# Patient Record
Sex: MALE | Race: WHITE | HISPANIC OR LATINO | URBAN - METROPOLITAN AREA
[De-identification: names, ages, dates, MRNs, and addresses within clinical notes are randomized per-mention and may not be internally consistent; named-entity substitution may affect disease eponyms.]

---

## 2017-01-10 ENCOUNTER — FOLLOW UP ESTABLISHED (OUTPATIENT)
Dept: URBAN - METROPOLITAN AREA CLINIC 51 | Facility: CLINIC | Age: 57
End: 2017-01-10
Payer: COMMERCIAL

## 2017-01-10 DIAGNOSIS — H34.8312 BRANCH RETINAL VEIN OCCLUSION, STABLE, RIGHT EYE: ICD-10-CM

## 2017-01-10 DIAGNOSIS — H25.13 AGE-RELATED NUCLEAR CATARACT, BILATERAL: Primary | ICD-10-CM

## 2017-01-10 DIAGNOSIS — H17.821 PERIPHERAL OPACITY OF CORNEA OF RIGHT EYE: ICD-10-CM

## 2017-01-10 PROCEDURE — 92014 COMPRE OPH EXAM EST PT 1/>: CPT | Performed by: OPTOMETRIST

## 2017-01-10 PROCEDURE — 92250 FUNDUS PHOTOGRAPHY W/I&R: CPT | Performed by: OPTOMETRIST

## 2017-01-10 ASSESSMENT — VISUAL ACUITY
OS: 20/25
OD: 20/30

## 2017-01-10 ASSESSMENT — INTRAOCULAR PRESSURE
OS: 14
OD: 14

## 2017-10-16 ENCOUNTER — FOLLOW UP ESTABLISHED (OUTPATIENT)
Dept: URBAN - METROPOLITAN AREA CLINIC 51 | Facility: CLINIC | Age: 57
End: 2017-10-16
Payer: COMMERCIAL

## 2017-10-16 DIAGNOSIS — H16.071 PERFORATED CORNEAL ULCER, RIGHT EYE: Primary | ICD-10-CM

## 2017-10-16 PROCEDURE — 92012 INTRM OPH EXAM EST PATIENT: CPT | Performed by: OPTOMETRIST

## 2019-09-11 ENCOUNTER — FOLLOW UP ESTABLISHED (OUTPATIENT)
Dept: URBAN - METROPOLITAN AREA CLINIC 51 | Facility: CLINIC | Age: 59
End: 2019-09-11
Payer: COMMERCIAL

## 2019-09-11 DIAGNOSIS — E11.9 TYPE 2 DIABETES MELLITUS WITHOUT COMPLICATIONS: Primary | ICD-10-CM

## 2019-09-11 DIAGNOSIS — H25.811 COMBINED FORMS OF AGE-RELATED CATARACT, RIGHT EYE: ICD-10-CM

## 2019-09-11 DIAGNOSIS — H25.12 AGE-RELATED NUCLEAR CATARACT, LEFT EYE: ICD-10-CM

## 2019-09-11 DIAGNOSIS — Z79.84 LONG TERM (CURRENT) USE OF ORAL ANTIDIABETIC DRUGS: ICD-10-CM

## 2019-09-11 DIAGNOSIS — H40.051 OCULAR HYPERTENSION, RIGHT EYE: ICD-10-CM

## 2019-09-11 PROCEDURE — 92014 COMPRE OPH EXAM EST PT 1/>: CPT | Performed by: OPTOMETRIST

## 2019-09-11 ASSESSMENT — KERATOMETRY: OS: 48.22

## 2019-09-11 ASSESSMENT — VISUAL ACUITY
OD: 20/LP
OS: 20/25

## 2019-09-11 ASSESSMENT — INTRAOCULAR PRESSURE
OD: 28
OS: 18

## 2019-10-07 ENCOUNTER — FOLLOW UP ESTABLISHED (OUTPATIENT)
Dept: URBAN - METROPOLITAN AREA CLINIC 44 | Facility: CLINIC | Age: 59
End: 2019-10-07
Payer: COMMERCIAL

## 2019-10-07 DIAGNOSIS — T86.841 CORNEAL TRANSPLANT FAILURE: ICD-10-CM

## 2019-10-07 DIAGNOSIS — Z94.7 CORNEAL TRANSPLANT STATUS: Primary | ICD-10-CM

## 2019-10-07 DIAGNOSIS — H25.21 AGE-RELATED CATARACT, MORGANIAN TYPE, RIGHT EYE: ICD-10-CM

## 2019-10-07 PROCEDURE — 92014 COMPRE OPH EXAM EST PT 1/>: CPT | Performed by: OPHTHALMOLOGY

## 2019-10-07 PROCEDURE — 92025 CPTRIZED CORNEAL TOPOGRAPHY: CPT | Performed by: OPHTHALMOLOGY

## 2019-10-07 PROCEDURE — 76514 ECHO EXAM OF EYE THICKNESS: CPT | Performed by: OPHTHALMOLOGY

## 2019-10-07 RX ORDER — OFLOXACIN 3 MG/ML
0.3 % SOLUTION/ DROPS OPHTHALMIC
Qty: 0 | Refills: 0 | Status: INACTIVE
Start: 2019-10-07 | End: 2019-10-24

## 2019-10-07 ASSESSMENT — VISUAL ACUITY
OS: 20/25
OD: 20/LP

## 2019-10-07 ASSESSMENT — INTRAOCULAR PRESSURE
OD: 14
OS: 19

## 2019-10-09 ENCOUNTER — FOLLOW UP ESTABLISHED (OUTPATIENT)
Dept: URBAN - METROPOLITAN AREA CLINIC 51 | Facility: CLINIC | Age: 59
End: 2019-10-09
Payer: COMMERCIAL

## 2019-10-09 PROCEDURE — 92012 INTRM OPH EXAM EST PATIENT: CPT | Performed by: OPTOMETRIST

## 2019-10-09 RX ORDER — MOXIFLOXACIN HYDROCHLORIDE 5 MG/ML
0.5 % SOLUTION/ DROPS OPHTHALMIC
Qty: 1 | Refills: 3 | Status: INACTIVE
Start: 2019-10-09 | End: 2019-11-07

## 2019-10-16 ENCOUNTER — FOLLOW UP ESTABLISHED (OUTPATIENT)
Dept: URBAN - METROPOLITAN AREA CLINIC 44 | Facility: CLINIC | Age: 59
End: 2019-10-16
Payer: COMMERCIAL

## 2019-10-16 PROCEDURE — 92012 INTRM OPH EXAM EST PATIENT: CPT | Performed by: OPHTHALMOLOGY

## 2019-10-16 RX ORDER — ERYTHROMYCIN 5 MG/G
OINTMENT OPHTHALMIC
Qty: 1 | Refills: 3 | Status: INACTIVE
Start: 2019-10-16 | End: 2019-11-07

## 2019-10-16 ASSESSMENT — INTRAOCULAR PRESSURE: OD: 20

## 2019-10-24 ENCOUNTER — FOLLOW UP ESTABLISHED (OUTPATIENT)
Dept: URBAN - METROPOLITAN AREA CLINIC 51 | Facility: CLINIC | Age: 59
End: 2019-10-24
Payer: COMMERCIAL

## 2019-10-24 PROCEDURE — 92012 INTRM OPH EXAM EST PATIENT: CPT | Performed by: OPTOMETRIST

## 2019-10-24 RX ORDER — PREDNISOLONE ACETATE 10 MG/ML
1 % SUSPENSION/ DROPS OPHTHALMIC
Qty: 1 | Refills: 3 | Status: INACTIVE
Start: 2019-10-24 | End: 2019-11-07

## 2019-10-24 RX ORDER — OFLOXACIN 3 MG/ML
0.3 % SOLUTION/ DROPS OPHTHALMIC
Qty: 1 | Refills: 3 | Status: INACTIVE
Start: 2019-10-24 | End: 2019-11-07

## 2019-10-24 ASSESSMENT — INTRAOCULAR PRESSURE
OD: 10
OS: 17

## 2019-10-30 ENCOUNTER — FOLLOW UP ESTABLISHED (OUTPATIENT)
Dept: URBAN - METROPOLITAN AREA CLINIC 44 | Facility: CLINIC | Age: 59
End: 2019-10-30
Payer: COMMERCIAL

## 2019-10-30 PROCEDURE — 92012 INTRM OPH EXAM EST PATIENT: CPT | Performed by: OPHTHALMOLOGY

## 2019-10-30 RX ORDER — OXYCODONE AND ACETAMINOPHEN 5; 325 MG/1; MG/1
TABLET ORAL
Qty: 15 | Refills: 0 | Status: INACTIVE
Start: 2019-10-30 | End: 2019-12-09

## 2019-10-30 ASSESSMENT — INTRAOCULAR PRESSURE: OD: 12

## 2019-11-07 ENCOUNTER — FOLLOW UP ESTABLISHED (OUTPATIENT)
Dept: URBAN - METROPOLITAN AREA CLINIC 51 | Facility: CLINIC | Age: 59
End: 2019-11-07
Payer: COMMERCIAL

## 2019-11-07 PROCEDURE — 92012 INTRM OPH EXAM EST PATIENT: CPT | Performed by: OPTOMETRIST

## 2019-11-07 RX ORDER — OFLOXACIN 3 MG/ML
0.3 % SOLUTION/ DROPS OPHTHALMIC
Qty: 1 | Refills: 3 | Status: INACTIVE
Start: 2019-11-07 | End: 2020-02-13

## 2019-11-07 RX ORDER — PREDNISOLONE ACETATE 10 MG/ML
1 % SUSPENSION/ DROPS OPHTHALMIC
Qty: 1 | Refills: 3 | Status: INACTIVE
Start: 2019-11-07 | End: 2020-02-13

## 2019-11-07 RX ORDER — ERYTHROMYCIN 5 MG/G
OINTMENT OPHTHALMIC
Qty: 1 | Refills: 3 | Status: INACTIVE
Start: 2019-11-07 | End: 2019-11-15

## 2019-11-07 ASSESSMENT — INTRAOCULAR PRESSURE
OD: 15
OS: 18

## 2019-12-09 ENCOUNTER — FOLLOW UP ESTABLISHED (OUTPATIENT)
Dept: URBAN - METROPOLITAN AREA CLINIC 44 | Facility: CLINIC | Age: 59
End: 2019-12-09
Payer: COMMERCIAL

## 2019-12-09 PROCEDURE — 92285 EXTERNAL OCULAR PHOTOGRAPHY: CPT | Performed by: OPHTHALMOLOGY

## 2019-12-09 PROCEDURE — 92012 INTRM OPH EXAM EST PATIENT: CPT | Performed by: OPHTHALMOLOGY

## 2019-12-09 RX ORDER — HYDROCODONE BITARTRATE AND ACETAMINOPHEN 5; 325 MG/1; MG/1
TABLET ORAL
Qty: 24 | Refills: 0 | Status: INACTIVE
Start: 2019-12-09 | End: 2020-02-13

## 2019-12-09 RX ORDER — HYDROCODONE BITARTRATE AND ACETAMINOPHEN 5; 325 MG/1; MG/1
TABLET ORAL
Qty: 25 | Refills: 0 | Status: INACTIVE
Start: 2019-12-09 | End: 2019-12-09

## 2019-12-09 ASSESSMENT — INTRAOCULAR PRESSURE
OD: 21
OS: 16

## 2019-12-18 ENCOUNTER — Encounter (OUTPATIENT)
Dept: URBAN - METROPOLITAN AREA EXTERNAL CLINIC 24 | Facility: EXTERNAL CLINIC | Age: 59
End: 2019-12-18
Payer: COMMERCIAL

## 2019-12-18 PROCEDURE — 65093 REVISE EYE WITH IMPLANT: CPT | Performed by: OPHTHALMOLOGY

## 2019-12-18 PROCEDURE — 67875 CLOSURE OF EYELID BY SUTURE: CPT | Performed by: OPHTHALMOLOGY

## 2019-12-19 ENCOUNTER — POST OP (OUTPATIENT)
Dept: URBAN - METROPOLITAN AREA CLINIC 44 | Facility: CLINIC | Age: 59
End: 2019-12-19

## 2019-12-19 PROCEDURE — 99024 POSTOP FOLLOW-UP VISIT: CPT | Performed by: OPHTHALMOLOGY

## 2020-01-02 ENCOUNTER — POST OP (OUTPATIENT)
Dept: URBAN - METROPOLITAN AREA CLINIC 44 | Facility: CLINIC | Age: 60
End: 2020-01-02

## 2020-01-02 PROCEDURE — 99024 POSTOP FOLLOW-UP VISIT: CPT | Performed by: OPHTHALMOLOGY

## 2020-02-13 ENCOUNTER — POST OP (OUTPATIENT)
Dept: URBAN - METROPOLITAN AREA CLINIC 44 | Facility: CLINIC | Age: 60
End: 2020-02-13

## 2020-02-13 PROCEDURE — 99024 POSTOP FOLLOW-UP VISIT: CPT | Performed by: OPHTHALMOLOGY

## 2020-03-16 ENCOUNTER — POST OP (OUTPATIENT)
Dept: URBAN - METROPOLITAN AREA CLINIC 44 | Facility: CLINIC | Age: 60
End: 2020-03-16
Payer: COMMERCIAL

## 2020-03-16 DIAGNOSIS — Z48.817 ENCNTR FOR SURGICAL AFTCR FOL SURGERY ON THE SKIN, SUBCU: Primary | ICD-10-CM

## 2020-03-16 PROCEDURE — 99024 POSTOP FOLLOW-UP VISIT: CPT | Performed by: OPHTHALMOLOGY

## 2021-05-06 ENCOUNTER — OFFICE VISIT (OUTPATIENT)
Dept: URBAN - METROPOLITAN AREA CLINIC 44 | Facility: CLINIC | Age: 61
End: 2021-05-06
Payer: COMMERCIAL

## 2021-05-06 DIAGNOSIS — H44.521 ATROPHY OF GLOBE, RIGHT EYE: Primary | ICD-10-CM

## 2021-05-06 PROCEDURE — 99212 OFFICE O/P EST SF 10 MIN: CPT | Performed by: OPHTHALMOLOGY

## 2021-05-06 NOTE — IMPRESSION/PLAN
Impression: Phthisis bulbi of right eye Plan: s/p evisceration and prosthesis placement. No signs of orbital implant exposure. prosthesis in good position. doing well. RTC PRN.

## 2022-10-25 ENCOUNTER — OFFICE VISIT (OUTPATIENT)
Dept: URBAN - METROPOLITAN AREA CLINIC 51 | Facility: CLINIC | Age: 62
End: 2022-10-25
Payer: COMMERCIAL

## 2022-10-25 DIAGNOSIS — H44.521 ATROPHY OF GLOBE, RIGHT EYE: Primary | ICD-10-CM

## 2022-10-25 PROCEDURE — 99213 OFFICE O/P EST LOW 20 MIN: CPT | Performed by: OPHTHALMOLOGY

## 2022-10-25 NOTE — IMPRESSION/PLAN
Impression: Phthisis bulbi of right eye Plan: s/p evisceration and prosthesis placement. No signs of orbital implant exposure. prosthesis in good position. doing well. RTC plastics PRN. Monocular precuations d/w patient. Given patient's monocular status and DMII, I would like him to have screening exams of healthy eye every year. will refer to optometry for DFE and MRx OS.
DC instructions